# Patient Record
Sex: FEMALE | Race: WHITE
[De-identification: names, ages, dates, MRNs, and addresses within clinical notes are randomized per-mention and may not be internally consistent; named-entity substitution may affect disease eponyms.]

---
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
## 2018-09-26 NOTE — MRI
MRI OF RIGHT WRIST PERFORMED WITHOUT CONTRAST ENHANCEMENT:

 

Date:  09/26/18 

 

HISTORY:  

Right wrist pain for months. 

 

FINDINGS:

The marrow signal change within the carpal bones, visualized portions of the metacarpals, and the dis
alvin radius and ulna are all normal. 

 

Triangular fibrocartilage is felt to be intact. Scapholunate and lunotriquetral ligaments are normal 
in appearance. 

 

Carpal tunnel region appears unremarkable. 

 

I do not appreciate any tenosynovitis change of the extensor tendons. 

 

IMPRESSION: 

Unremarkable MRI of the wrist. 

 

 

 

 

POS: [facility]

## 2019-10-28 ENCOUNTER — HOSPITAL ENCOUNTER (INPATIENT)
Dept: HOSPITAL 92 - ERS | Age: 19
LOS: 2 days | Discharge: HOME | DRG: 915 | End: 2019-10-30
Attending: INTERNAL MEDICINE | Admitting: INTERNAL MEDICINE
Payer: COMMERCIAL

## 2019-10-28 VITALS — BODY MASS INDEX: 22.6 KG/M2

## 2019-10-28 DIAGNOSIS — J96.00: ICD-10-CM

## 2019-10-28 DIAGNOSIS — J45.909: ICD-10-CM

## 2019-10-28 DIAGNOSIS — R73.9: ICD-10-CM

## 2019-10-28 DIAGNOSIS — D72.829: ICD-10-CM

## 2019-10-28 DIAGNOSIS — T78.3XXA: ICD-10-CM

## 2019-10-28 DIAGNOSIS — T78.07XA: Primary | ICD-10-CM

## 2019-10-28 DIAGNOSIS — E87.6: ICD-10-CM

## 2019-10-28 LAB
ALBUMIN SERPL BCG-MCNC: 4 G/DL (ref 3.5–5)
ALP SERPL-CCNC: 64 U/L (ref 40–110)
ALT SERPL W P-5'-P-CCNC: 15 U/L (ref 8–55)
ANALYZER IN CARDIO: (no result)
ANION GAP SERPL CALC-SCNC: 20 MMOL/L (ref 10–20)
AST SERPL-CCNC: 12 U/L (ref 5–34)
BASE EXCESS STD BLDA CALC-SCNC: -14.3 MEQ/L
BASE EXCESS STD BLDA CALC-SCNC: -8.7 MEQ/L
BASE EXCESS STD BLDA CALC-SCNC: -9.4 MEQ/L
BILIRUB SERPL-MCNC: 0.2 MG/DL (ref 0.2–1.2)
BUN SERPL-MCNC: 20 MG/DL (ref 7–18.7)
CA-I BLDA-SCNC: 1.16 MMOL/L (ref 1.12–1.3)
CA-I BLDA-SCNC: 1.16 MMOL/L (ref 1.12–1.3)
CA-I BLDA-SCNC: 1.26 MMOL/L (ref 1.12–1.3)
CALCIUM SERPL-MCNC: 8.8 MG/DL (ref 7.8–10.44)
CHLORIDE SERPL-SCNC: 104 MMOL/L (ref 98–107)
CO2 SERPL-SCNC: 16 MMOL/L (ref 22–29)
CREAT CL PREDICTED SERPL C-G-VRATE: 0 ML/MIN (ref 70–130)
GLOBULIN SER CALC-MCNC: 2.5 G/DL (ref 2.4–3.5)
GLUCOSE SERPL-MCNC: 279 MG/DL (ref 70–105)
HCO3 BLDA-SCNC: 15.6 MEQ/L (ref 22–28)
HCO3 BLDA-SCNC: 19.3 MEQ/L (ref 22–28)
HCO3 BLDA-SCNC: 19.5 MEQ/L (ref 22–28)
HCT VFR BLDA CALC: 40 % (ref 36–47)
HCT VFR BLDA CALC: 40 % (ref 36–47)
HCT VFR BLDA CALC: 43 % (ref 36–47)
HGB BLD-MCNC: 14.4 G/DL (ref 12–16)
HGB BLDA-MCNC: 13.5 G/DL (ref 12–16)
HGB BLDA-MCNC: 13.7 G/DL (ref 12–16)
HGB BLDA-MCNC: 14.6 G/DL (ref 12–16)
MCH RBC QN AUTO: 31 PG (ref 27–31)
MCV RBC AUTO: 97 FL (ref 78–98)
MDIFF COMPLETE?: YES
O2 A-A PPRESDIFF RESPIRATORY: 218.6 MM[HG] (ref 0–20)
O2 A-A PPRESDIFF RESPIRATORY: 35.73 MM[HG] (ref 0–20)
O2 A-A PPRESDIFF RESPIRATORY: 508.82 MM[HG] (ref 0–20)
PCO2 BLDA: 31.5 MMHG (ref 35–45)
PCO2 BLDA: 49.6 MMHG (ref 35–45)
PCO2 BLDA: 89.5 MMHG (ref 35–45)
PH BLDA: 6.96 [PH] (ref 7.35–7.45)
PH BLDA: 7.21 [PH] (ref 7.35–7.45)
PH BLDA: 7.31 [PH] (ref 7.35–7.45)
PLATELET # BLD AUTO: 431 THOU/UL (ref 130–400)
PO2 BLDA: 210.1 MMHG (ref 80–100)
PO2 BLDA: 75.9 MMHG (ref 80–100)
PO2 BLDA: 92.3 MMHG (ref 80–100)
POLYCHROMASIA BLD QL SMEAR: (no result) (100X)
POTASSIUM BLD-SCNC: 3.04 MMOL/L (ref 3.7–5.3)
POTASSIUM BLD-SCNC: 3.22 MMOL/L (ref 3.7–5.3)
POTASSIUM BLD-SCNC: 4.05 MMOL/L (ref 3.7–5.3)
POTASSIUM SERPL-SCNC: 2.7 MMOL/L (ref 3.5–5.1)
POTASSIUM SERPL-SCNC: 3.7 MMOL/L (ref 3.5–5.1)
RBC # BLD AUTO: 4.66 MILL/UL (ref 4.2–5.4)
REFLEX FOR REVIEW??: YES
SODIUM SERPL-SCNC: 137 MMOL/L (ref 136–145)
SPECIMEN DRAWN FROM PATIENT: (no result)
WBC # BLD AUTO: 16.6 THOU/UL (ref 4.8–10.8)

## 2019-10-28 PROCEDURE — 94002 VENT MGMT INPAT INIT DAY: CPT

## 2019-10-28 PROCEDURE — 71045 X-RAY EXAM CHEST 1 VIEW: CPT

## 2019-10-28 PROCEDURE — 96365 THER/PROPH/DIAG IV INF INIT: CPT

## 2019-10-28 PROCEDURE — 96375 TX/PRO/DX INJ NEW DRUG ADDON: CPT

## 2019-10-28 PROCEDURE — 36416 COLLJ CAPILLARY BLOOD SPEC: CPT

## 2019-10-28 PROCEDURE — 85027 COMPLETE CBC AUTOMATED: CPT

## 2019-10-28 PROCEDURE — 80053 COMPREHEN METABOLIC PANEL: CPT

## 2019-10-28 PROCEDURE — 51702 INSERT TEMP BLADDER CATH: CPT

## 2019-10-28 PROCEDURE — S0028 INJECTION, FAMOTIDINE, 20 MG: HCPCS

## 2019-10-28 PROCEDURE — 36415 COLL VENOUS BLD VENIPUNCTURE: CPT

## 2019-10-28 PROCEDURE — 85007 BL SMEAR W/DIFF WBC COUNT: CPT

## 2019-10-28 PROCEDURE — 0BH17EZ INSERTION OF ENDOTRACHEAL AIRWAY INTO TRACHEA, VIA NATURAL OR ARTIFICIAL OPENING: ICD-10-PCS | Performed by: INTERNAL MEDICINE

## 2019-10-28 PROCEDURE — 85025 COMPLETE CBC W/AUTO DIFF WBC: CPT

## 2019-10-28 PROCEDURE — 5A1935Z RESPIRATORY VENTILATION, LESS THAN 24 CONSECUTIVE HOURS: ICD-10-PCS | Performed by: INTERNAL MEDICINE

## 2019-10-28 PROCEDURE — 93005 ELECTROCARDIOGRAM TRACING: CPT

## 2019-10-28 PROCEDURE — 82805 BLOOD GASES W/O2 SATURATION: CPT

## 2019-10-28 PROCEDURE — 85060 BLOOD SMEAR INTERPRETATION: CPT

## 2019-10-28 PROCEDURE — 94003 VENT MGMT INPAT SUBQ DAY: CPT

## 2019-10-28 RX ADMIN — FAMOTIDINE SCH MG: 10 INJECTION, SOLUTION INTRAVENOUS at 20:25

## 2019-10-28 RX ADMIN — IPRATROPIUM BROMIDE SCH ML: 0.5 SOLUTION RESPIRATORY (INHALATION) at 18:24

## 2019-10-28 RX ADMIN — IPRATROPIUM BROMIDE SCH ML: 0.5 SOLUTION RESPIRATORY (INHALATION) at 21:56

## 2019-10-28 NOTE — RAD
XR Chest 1 View Portable



History: Intubated patient



Comparison: None.



Findings: Patient is intubated with endotracheal tube tip at the level of the clavicles in good posit
ion. Enteric tube tip below diaphragm although out of field of view. The lungs are clear. No

pneumothorax or effusion.



Impression: Satisfactory position of endotracheal tube. Enteric tube tip below diaphragm although out
 of field of view.



Reported By: Albin Angel 

Electronically Signed:  10/28/2019 3:41 PM

## 2019-10-28 NOTE — HP
CHIEF COMPLAINT:  Anaphylactic shock.



HISTORY OF PRESENT ILLNESS:  This is a 19-year-old female was eating at a restaurant

and went into respiratory arrest and the patient was brought in intubated on

ventilator.  We were called in by the ER staff to admit the patient.  The

pulmonology critical care specialist had already seen the patient and evaluated.

The patient has been on ventilator currently. 



PAST MEDICAL HISTORY:  Negative.



ALLERGIES:  TO NUTS.



FAMILY HISTORY:  Nothing contributory.



SOCIAL HISTORY:  Does not smoke or drink alcohol.



REVIEW OF SYSTEMS:  Difficult to assess as the patient has been intubated.



PHYSICAL EXAMINATION:

GENERAL:  The patient is intubated, sedated. 

VITAL SIGNS:  Stable, afebrile. 

HEENT:  OLE.  Atraumatic, normocephalic.  Dry mucous membranes.  Examination of the

lips and lids shows edema present. 

NECK:  Supple.  No JVD. 

LUNGS:  Clear to auscultation. 

CV:  S1 and S2 heard. 

ABDOMEN:  Soft.  Bowel sounds are present, nontender, nondistended. 

EXTREMITIES:  No cyanosis, calf tenderness, or edema. 

CNS:  Nonfocal.



LABORATORY DATA:  White count 16,000, hemoglobin 14.4, hematocrit 45.2, platelets

431.  Sodium 137, potassium 2.7, sugar 279. 



IMPRESSION:  

1. Acute anaphylactic reaction.

2. Hypokalemia.

3. Stress-induced elevated white count.



PLAN:  

1. The patient has been admitted to ICU.  Dr. Quezada, pulmonologist, visited the

patient in the ER room and evaluated the patient and appropriate instructions were

given.  I met the patient's best friend who is at the bedside and explained the

risks, complications at length.  Appreciate pulmonology input. 

2. Steroids as needed.

3. Vent management as per Pulmonology.

4. Continue to monitor the patient and we will place the patient on DVT prophylaxis

as well.  Further recommendation depending on the clinical course.  We will place

the patient on Protonix as well. 







Job ID:  [id number]

## 2019-10-28 NOTE — CON
DATE OF CONSULTATION:  10/28/2019



REASON FOR CONSULTATION:  Acute respiratory failure related anaphylaxis, following

encompasses 45 minutes of critical care time. 



HISTORY OF PRESENT ILLNESS:  The patient is a 19-year-old female, who apparently got

some coffee from [employer] and took it to a classroom today that the patient drank

it and identified there was probably some milk in the product.  She immediately

began to have anaphylaxis.  EMS was called.  The patient had an EpiPen injected.

She was intubated by EMS en route.  She received further doses of epinephrine,

Benadryl, Pepcid, and IV Solu-Medrol.  She is now intubated and on mechanical

ventilation. 



PAST MEDICAL HISTORY:  Remarkable for:

1. Asthma, controlled with metered-dose inhaler.

2. 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



SOCIAL HISTORY:  Nonsmoker.  Does not consume alcohol.



REVIEW OF SYSTEMS:  Cannot be obtained as the patient is currently intubated on

mechanical ventilation. 



MEDICATIONS PRIOR TO ADMISSION:  She had a Xopenex metered-dose inhaler in her

possession. 



FAMILY MEDICAL HISTORY:  Not known at this time.



PHYSICAL EXAMINATION:

VITAL SIGNS:  Temperature 97.0, pulse 123, blood pressure 151/107, and O2 saturation

99%. 

GENERAL:  She is intubated and paralyzed. 

HEENT:  Pupils are 4 mm, currently not reactive.  Sclerae anicteric.  Oropharynx,

she has enlarged tongue.  Her lower lip is also swollen. 

NECK:  No adenopathy or JVD. 

CHEST:  No wheezing at this time. 

CARDIOVASCULAR:  S1 and S2.  Slightly tachycardic.  No murmur. 

ABDOMEN:  Soft and nontender.  She has a piercing in her umbilicus. 

EXTREMITIES:  No clubbing, cyanosis, or edema.  She previously had urticarial-type

rash which is dissipated according to the nursing staff. 



LABORATORY DATA:  Chest x-ray shows no mass, effusion, or infiltrate.  White blood

cell count 16.6, hematocrit 45.2, and platelet count 431 with 23% neutrophils, 46%

reactive lymphocytes.  PH is 7.21, pCO2 of 49, pO2 of 75 on SIMV rate 22, tidal

volume 470, PEEP 5, pressure support of 10, and FiO2 of 50%.  Initially, her blood

gas showed a pH of 6.96, pCO2 of 89, and pO2 of 82.  Sodium is 137, potassium 2.7,

chloride 104, CO2 of 16, BUN 20, creatinine 1.1, and glucose 279. 



ASSESSMENT:  

1. Anaphylactic shock secondary to milk protein.

2. Acute respiratory failure, requiring mechanical ventilation.

3. History of asthma.

4. Low potassium.

5. Hyperglycemia.



PLAN:  The patient will be kept intubated overnight.  She will be given Solu-Medrol,

breathing treatments, IV Benadryl, and IV Pepcid.  Hopefully, can extubate tomorrow. 







Job ID:  [id number]

## 2019-10-29 LAB
ALBUMIN SERPL BCG-MCNC: 3.8 G/DL (ref 3.5–5)
ALP SERPL-CCNC: 51 U/L (ref 40–110)
ALT SERPL W P-5'-P-CCNC: 16 U/L (ref 8–55)
ANION GAP SERPL CALC-SCNC: 17 MMOL/L (ref 10–20)
AST SERPL-CCNC: 7 U/L (ref 5–34)
BILIRUB SERPL-MCNC: 0.2 MG/DL (ref 0.2–1.2)
BUN SERPL-MCNC: 17 MG/DL (ref 7–18.7)
CALCIUM SERPL-MCNC: 8.8 MG/DL (ref 7.8–10.44)
CHLORIDE SERPL-SCNC: 109 MMOL/L (ref 98–107)
CO2 SERPL-SCNC: 16 MMOL/L (ref 22–29)
CREAT CL PREDICTED SERPL C-G-VRATE: 147 ML/MIN (ref 70–130)
GLOBULIN SER CALC-MCNC: 2.1 G/DL (ref 2.4–3.5)
GLUCOSE SERPL-MCNC: 140 MG/DL (ref 70–105)
HGB BLD-MCNC: 11.9 G/DL (ref 12–16)
MCH RBC QN AUTO: 31.5 PG (ref 27–31)
MCV RBC AUTO: 93.4 FL (ref 78–98)
MDIFF COMPLETE?: YES
PLATELET # BLD AUTO: 327 THOU/UL (ref 130–400)
POTASSIUM SERPL-SCNC: 4.7 MMOL/L (ref 3.5–5.1)
RBC # BLD AUTO: 3.78 MILL/UL (ref 4.2–5.4)
SODIUM SERPL-SCNC: 137 MMOL/L (ref 136–145)
WBC # BLD AUTO: 13.8 THOU/UL (ref 4.8–10.8)

## 2019-10-29 RX ADMIN — FAMOTIDINE SCH MG: 10 INJECTION, SOLUTION INTRAVENOUS at 20:04

## 2019-10-29 RX ADMIN — Medication SCH: at 11:10

## 2019-10-29 RX ADMIN — IPRATROPIUM BROMIDE SCH ML: 0.5 SOLUTION RESPIRATORY (INHALATION) at 18:20

## 2019-10-29 RX ADMIN — IPRATROPIUM BROMIDE SCH ML: 0.5 SOLUTION RESPIRATORY (INHALATION) at 06:40

## 2019-10-29 RX ADMIN — FAMOTIDINE SCH MG: 10 INJECTION, SOLUTION INTRAVENOUS at 08:17

## 2019-10-29 RX ADMIN — IPRATROPIUM BROMIDE SCH ML: 0.5 SOLUTION RESPIRATORY (INHALATION) at 02:31

## 2019-10-29 RX ADMIN — Medication SCH: at 20:05

## 2019-10-29 RX ADMIN — IPRATROPIUM BROMIDE SCH ML: 0.5 SOLUTION RESPIRATORY (INHALATION) at 21:54

## 2019-10-29 RX ADMIN — MOMETASONE FUROATE AND FORMOTEROL FUMARATE DIHYDRATE SCH PUFF: 200; 5 AEROSOL RESPIRATORY (INHALATION) at 18:22

## 2019-10-29 RX ADMIN — IPRATROPIUM BROMIDE SCH ML: 0.5 SOLUTION RESPIRATORY (INHALATION) at 11:13

## 2019-10-29 RX ADMIN — IPRATROPIUM BROMIDE SCH ML: 0.5 SOLUTION RESPIRATORY (INHALATION) at 14:38

## 2019-10-29 NOTE — RAD
SEMIUPRIGHT PORTABLE CHEST 1 VIEW:

 

Date:  10/29/19 

 

HISTORY:  

Respiratory insufficiency. 

 

COMPARISON:  

10/28/19

 

FINDINGS:

Life support tubes remain in place. There is some rotation to the right. No confluent pneumonia, over
t edema, pleural effusion, or other acute process. 

 

IMPRESSION: 

No acute intrathoracic disease. Stable from prior study. 

 

 

POS: [facility]

## 2019-10-29 NOTE — PRG
DATE OF SERVICE:  10/29/2019



TIME SPENT:  35 minutes of critical care time.



SUBJECTIVE:  The patient was still on mechanical ventilation when I arrived today.

She had passed a spontaneous breathing trial and had an air leak around her cuff

when deflated. 



OBJECTIVE:  VITAL SIGNS:  On exam, temperature is 98.5, pulse 119, blood pressure

115/75, and O2 saturation 100%. 

GENERAL:  She is tearful, but awake, follows commands. 

HEENT:  Remarkable for some lip swelling.  Tongue is normal size. 

NECK:  Without adenopathy or JVD. 

CHEST:  Clear to auscultation. 

CARDIAC:  S1 and S2, regular. 

ABDOMEN:  Soft and nontender. 

EXTREMITIES:  No clubbing, cyanosis, or edema.  No rash present.



LABORATORY DATA:  White blood cell count 13.8, hematocrit 35.3, and platelet count

327.  Sodium 137, potassium 4.7, chloride 109, CO2 of 16, BUN 17, creatinine 0.7,

glucose 140, and albumin 3.8. 



ASSESSMENT:  

1. Anaphylactic reaction to milk protein.

2. Acute respiratory failure, requiring mechanical ventilation.



PLAN:  

1. Extubate and observe.

2. Keep in ICU for several more hours to monitor.

3. Decrease IV fluids.

4. Restricted diet.







Job ID:  [id number]

## 2019-10-29 NOTE — PDOC.HOSPP
- Subjective


Encounter Date: 10/29/19


Encounter Time: 09:13


Subjective: 





Extrubated, awake. Does not want SCDs as it was squeezing, The patient has been 

on Lovnox already, requesting to D?[facility] clarke





- Objective


Vital Signs & Weight: 


 Vital Signs (12 hours)











  Temp Pulse Resp BP Pulse Ox


 


 10/29/19 08:40   118 H  18   99


 


 10/29/19 06:43   117 H   113/67 


 


 10/29/19 06:40   97  16   99


 


 10/29/19 06:00    15  


 


 10/29/19 05:00  98.5 F    


 


 10/29/19 04:00    18  


 


 10/29/19 02:31   100  18   100


 


 10/29/19 02:00    18  


 


 10/29/19 00:00  99.3 F   20  


 


 10/28/19 22:00    18  


 


 10/28/19 21:56   110 H  20   100








 Weight











Weight                         159 lb 13.362 oz











 Most Recent Monitor Data











Heart Rate from ECG            126


 


NIBP                           118/91


 


NIBP BP-Mean                   100


 


Respiration from ECG           18


 


SpO2                           100














I&O: 


 











 10/28/19 10/29/19 10/30/19





 06:59 06:59 06:59


 


Intake Total  1547.5 


 


Output Total  1250 


 


Balance  297.5 











Result Diagrams: 


 10/29/19 03:19





 10/29/19 03:19


Additional Labs: 


 Accuchecks











  10/29/19 10/28/19





  05:57 23:28


 


POC Glucose  125 H  127 H














Hospitalist ROS





- Medication


Medications: 


Active Medications











Generic Name Dose Route Start Last Admin





  Trade Name Freq  PRN Reason Stop Dose Admin


 


Acetaminophen  650 mg  10/29/19 08:33  10/29/19 09:05





  Tylenol  PO   650 mg





  Q6H PRN   Administration





  Headache/Fever or Pain   





     





     





     


 


Clonazepam  1 mg  10/29/19 07:39  10/29/19 08:14





  Klonopin  PO   1 mg





  TID PRN   Administration





  Anxiety   





     





     





     


 


Enoxaparin Sodium  40 mg  10/29/19 09:00  10/29/19 08:34





  Lovenox  SC   40 mg





  0900 RAUL   Administration





     





     





     





     


 


Famotidine  20 mg  10/28/19 21:00  10/29/19 08:17





  Pepcid  SLOW IVP   20 mg





  Q12HR RAUL   Administration





     





     





     





     


 


Sodium Chloride  1,000 mls @ 50 mls/hr  10/29/19 07:30  10/29/19 08:20





  Normal Saline 0.9%  IV   Not Given





  .Q20H RAUL   





     





     





     





     


 


Ipratropium Bromide  2.5 ml  10/28/19 18:30  10/29/19 06:40





  Atrovent  NEB   2.5 ml





  U3LS-RA RAUL   Administration





     





     





     





     


 


Methylprednisolone Sodium Succinate  60 mg  10/28/19 18:00  10/29/19 05:50





  Solu-Medrol  IVP   60 mg





  Q6HR RAUL   Administration





     





     





     





     














- Exam


General Appearance: NAD, awake alert, ill appearing


ENT: normocephalic atraumatic, no oropharyngeal lesions, moist mucosa, dry oral 

mucosa


Neck: supple, symmetric, no JVD, no thyromegaly, no lymphadenopathy, no carotid 

bruit, JVD


Heart: RRR, no murmur, no gallops, no rubs, normal peripheral pulses, irregular

, diminshed peripheral pulses, murmur present, II/IV, III/IV


Respiratory: CTAB, no wheezes, no rales, no ronchi, normal chest expansion, no 

tachypnea, normal percussion, rales, rhonchi, tachypneic, wheezes


Gastrointestinal: soft, non-tender, non-distended, normal bowel sounds, no 

palpable masses, no hepatomegaly, no splenomegaly, no bruit, no guarding, no 

rigidity, tender to palpation, distended, diminished bowl sounds, voluntary 

guarding


Extremities: no cyanosis, no clubbing, no edema, 1+ LE edema, 2+ LE edema, 

clubbing


Skin: normal turgor, no lesions, no rashes, tenting


Neurological: cranial nerve grossly intact, normal sensation to touch, no 

weakness, no focal deficits, no new deficit, facial droop, hemiplegia, speech 

deficit, vision deficit





Hosp A/P


(1) Respiratory failure


Code(s): J96.90 - RESPIRATORY FAILURE, UNSP, UNSP W HYPOXIA OR HYPERCAPNIA   

Status: Acute   





(2) Anaphylactic reaction due to peanuts


Code(s): T78.01XA - ANAPHYLACTIC REACTION DUE TO PEANUTS, INITIAL ENCOUNTER   

Status: Acute   





- Plan


plan discussed w/ family (D/c vince)





D?C SCD.

## 2019-10-30 VITALS — SYSTOLIC BLOOD PRESSURE: 118 MMHG | TEMPERATURE: 98.1 F | DIASTOLIC BLOOD PRESSURE: 75 MMHG

## 2019-10-30 LAB
ALBUMIN SERPL BCG-MCNC: 3.9 G/DL (ref 3.5–5)
ALP SERPL-CCNC: 47 U/L (ref 40–110)
ALT SERPL W P-5'-P-CCNC: 14 U/L (ref 8–55)
ANION GAP SERPL CALC-SCNC: 12 MMOL/L (ref 10–20)
AST SERPL-CCNC: 7 U/L (ref 5–34)
BILIRUB SERPL-MCNC: 0.2 MG/DL (ref 0.2–1.2)
BUN SERPL-MCNC: 11 MG/DL (ref 7–18.7)
CALCIUM SERPL-MCNC: 9 MG/DL (ref 7.8–10.44)
CHLORIDE SERPL-SCNC: 106 MMOL/L (ref 98–107)
CO2 SERPL-SCNC: 24 MMOL/L (ref 22–29)
CREAT CL PREDICTED SERPL C-G-VRATE: 172 ML/MIN (ref 70–130)
GLOBULIN SER CALC-MCNC: 2.2 G/DL (ref 2.4–3.5)
GLUCOSE SERPL-MCNC: 125 MG/DL (ref 70–105)
HGB BLD-MCNC: 10.8 G/DL (ref 12–16)
MCH RBC QN AUTO: 31 PG (ref 27–31)
MCV RBC AUTO: 93 FL (ref 78–98)
MDIFF COMPLETE?: YES
PLATELET # BLD AUTO: 314 THOU/UL (ref 130–400)
POTASSIUM SERPL-SCNC: 4.1 MMOL/L (ref 3.5–5.1)
RBC # BLD AUTO: 3.49 MILL/UL (ref 4.2–5.4)
SODIUM SERPL-SCNC: 138 MMOL/L (ref 136–145)
WBC # BLD AUTO: 12.1 THOU/UL (ref 4.8–10.8)

## 2019-10-30 RX ADMIN — IPRATROPIUM BROMIDE SCH ML: 0.5 SOLUTION RESPIRATORY (INHALATION) at 10:27

## 2019-10-30 RX ADMIN — IPRATROPIUM BROMIDE SCH ML: 0.5 SOLUTION RESPIRATORY (INHALATION) at 06:45

## 2019-10-30 RX ADMIN — IPRATROPIUM BROMIDE SCH ML: 0.5 SOLUTION RESPIRATORY (INHALATION) at 14:53

## 2019-10-30 RX ADMIN — IPRATROPIUM BROMIDE SCH ML: 0.5 SOLUTION RESPIRATORY (INHALATION) at 02:16

## 2019-10-30 RX ADMIN — MOMETASONE FUROATE AND FORMOTEROL FUMARATE DIHYDRATE SCH PUFF: 200; 5 AEROSOL RESPIRATORY (INHALATION) at 06:43

## 2019-10-30 RX ADMIN — FAMOTIDINE SCH MG: 10 INJECTION, SOLUTION INTRAVENOUS at 08:35

## 2019-10-30 RX ADMIN — Medication SCH ML: at 08:35

## 2019-10-30 NOTE — PRG
DATE OF SERVICE:  10/30/2019



SUBJECTIVE:  She feels good and wants to go home.



OBJECTIVE:  VITAL SIGNS:  Temperature 97.4, pulse 63, respirations 16, O2 saturation

97% on room air, blood pressure 124/78. 

HEENT:  Unremarkable.  Lip swelling has dissipated. 

NECK:  No JVD. 

CHEST:  Clear. 

CARDIAC:  S1 and S2.  Regular. 

ABDOMEN:  Soft. 

EXTREMITIES:  No edema.



ASSESSMENT:  

1. Status post anaphylactic reaction to milk protein.

2. History of asthma.



PLAN:  The patient is stable for discharge.  She will need EpiPen.  I have told her

to go see an allergist to establish care.  Continue her outpatient asthma

medications. 







Job ID:  [id number]

## 2019-10-30 NOTE — DIS
DATE OF ADMISSION:  10/28/2019



DATE OF DISCHARGE:  10/30/2019



ADMITTING DIAGNOSIS:  Acute anaphylactic shock and angioedema requiring vent support.



DISCHARGE DIAGNOSIS:  Anaphylactic shock, resolved.



CONSULTANTS ON THE CASE:  Pulmonology and Critical Care.



HOSPITAL COURSE:  The patient has been hospitalized to ICU, was placed on ventilator

and Solu-Medrol IV was given.  The patient's symptoms started improving by next day,

the patient has been extubated, tolerating diet well and continued steroids one more

day at the recommendations of Pulmonology Critical Care.  This morning, Pulmonology

Critical Care determined the patient to be transferred to outpatient care for

followup with Allergy and Immunology as well as PCP, which the patient already has

established care. 



DISCHARGE INSTRUCTIONS:  Discharge patient to outpatient care.  Diet regular.

Activity as tolerated. 



CONDITION OF THE PATIENT AT THE TIME OF DISCHARGE:  Tolerating diet well, ambulating

and breathing well. 



FOLLOWUP CARE:  Follow up with allergist and PCP as scheduled.



DISCHARGE MEDICATIONS:  EpiPen __________ use as directed p.r.n. as well as family

counseling for all dietary allergies. 







Job ID:  [id number]

## 2020-02-07 ENCOUNTER — HOSPITAL ENCOUNTER (OUTPATIENT)
Dept: HOSPITAL 92 - CT | Age: 20
Discharge: HOME | End: 2020-02-07
Attending: FAMILY MEDICINE
Payer: COMMERCIAL

## 2020-02-07 DIAGNOSIS — R10.84: Primary | ICD-10-CM

## 2020-02-07 DIAGNOSIS — R10.2: ICD-10-CM

## 2020-02-07 PROCEDURE — 74177 CT ABD & PELVIS W/CONTRAST: CPT

## 2020-02-07 NOTE — CT
CT ABDOMEN AND PELVIS WITH CONTRAST:

 

Date:  02/07/2020

 

HISTORY:  

Lower abdominal pain for 1 month, getting worse over last 2 weeks. 

 

TECHNIQUE:  

Multiple contiguous axial images were obtained in a CT of the abdomen and pelvis with contrast. PO co
ntrast was administered. Sagittal and coronal reformats were performed. 

 

FINDINGS:

The liver, gallbladder, kidneys, adrenal glands, spleen, and pancreas are unremarkable. No free air, 
free fluid, or stranding changes are seen in the abdomen or pelvis. 

 

The reproductive organs are unremarkable. The large and small bowel are unremarkable. The appendix is
 normal. No abdominal or pelvic lymphadenopathy is seen. 

 

The osseous structures, visualized inferior thorax, and abdominal wall soft tissues are unremarkable.
 

 

IMPRESSION: 

No evidence of acute intra-abdominal/pelvic abnormality. 

 

 

POS: [facility]